# Patient Record
(demographics unavailable — no encounter records)

---

## 2025-01-09 NOTE — DISCUSSION/SUMMARY
[de-identified] : Recommend patient initiate conservative management, including outpatient physical therapy and anti-inflammatory medication if tolerated. She will contact primary care physician and update PCP as to current conditions and work up.  MRI imaging cervical and lumbar spine for further evaluation of intrinsic lesion, disc herniation, or other pathology that could count for both upper and lower right side of symptoms. Clearly, she has a history of lumbar disc herniation on prior lumbar MRI. It is possible that current symptoms in the right lower extremity do stem from this herniation, but I cannot count for her right upper extremity symptoms.   Patient will follow up after MRI imaging to review results. We discussed potential referral to neurology for further work up if imaging is negative.   Prior to appointment and during encounter with patient extensive medical records were reviewed including but not limited to, hospital records, outpatient records, imaging results, and lab data. During this appointment the patient was examined, diagnoses were discussed and explained in a face to face manner. In addition extensive time was spent reviewing aforementioned diagnostic studies. Counseling including abnormal image results, differential diagnoses, treatment options, risk and benefits, lifestyle changes, current condition, and current medications was performed. Patient's comments, questions, and concerns were addressed and patient verbalized understanding. Based on this patient's presentation at our office, which is an orthopedic spine surgeon's office, this patient inherently / intrinsically has a risk, however minute, of developing issues such as Cauda equina syndrome, bowel and bladder changes, or progression of motor or neurological deficits such as paralysis which may be permanent.   I, [FAM Georges], certify that the clinical information was reviewed with Dr. Cheung, who was physically present in the office. He agreed with the above plan of care and was available for consultation as necessary during the office visit.

## 2025-01-09 NOTE — PHYSICAL EXAM
[Normal Coordination] : normal coordination [Normal DTR UE/LE] : normal DTR UE/LE  [Normal Sensation] : normal sensation [Normal Mood and Affect] : normal mood and affect [Oriented] : oriented [Able to Communicate] : able to communicate [Normal Skin] : normal skin [No Rash] : no rash [No Ulcers] : no ulcers [No Lesions] : no lesions [No obvious lymphadenopathy in areas examined] : no obvious lymphadenopathy in areas examined [Well Developed] : well developed [Peripheral vascular exam is grossly normal] : peripheral vascular exam is grossly normal [No Respiratory Distress] : no respiratory distress [Lungs clear to auscultation bilaterally] : lungs clear to auscultation bilaterally [Normal Bowel Sounds] : normal bowel sounds [Non-Tender] : non-tender [No HSM] : no HSM [No Mass] : no mass [NL (80)] : right lateral rotation 80 degrees [Biceps 2+] : biceps 2+ [Triceps 2+] : triceps 2+ [Brachioradialis 2+] : brachioradialis 2+ [NL (90)] : forward flexion 90 degrees [NL (30)] : right lateral rotation 30 degrees [NL (45)] : extension 45 degrees [NL (40)] : right lateral bending 40 degrees [5___] : right extensor hallicus longus 5[unfilled]/5 [] : non-antalgic

## 2025-01-09 NOTE — HISTORY OF PRESENT ILLNESS
[de-identified] : 01/08/2025 - Patient presents to the office for evaluation of right upper extremity tingling, right lower extremity tingling and history of color change to the skin of the right foot. Patient reports a 20+ year history of intermittent low back symptoms dating back to 2005. She has a known history of lumbar disc herniation that has been treated conservatively. Patient reports on 12/19/24. She experienced numbness tingling in the right lower extremity with low back pain. There was also right upper extremity numbness and tingling. She does not recall any trauma. On 12/24/24 she noticed color changes to the lateral aspect of her right foot without previous trauma. She presented to urgent care. X-ray was negative for fracture. Color changes to the foot resolved within 24 hours.  Since the onset of symptoms, she continues to experience, altered sensation and subjective weakness in both upper and lower extremity. Negative constitutional complaints in terms of fever, chills, rash, nausea, vomiting. No vision changes. No changes in hearing. No left upper extremity symptoms. There is a history of Spinal disc degeneration. No familial history of auto immune disorder. PCP had performed work for autoimmune disorders and started patient on SSRI. This did seem to improve her G.I. symptoms.  The patient is a 42 year female who presents today complaining of low back pain radiating down right side Date of Injury/Onset: 12/18/24 Pain:    At Rest: 2/10  With Activity:  6-7/10  Mechanism of injury: NKI Quality of symptoms: numbness, tingling, sharp at times Improves with: nothing Worse with: activity, sneezing Prior treatment: no Prior Imaging: no Additional Information: history of herniated disc in lumbar spine

## 2025-01-09 NOTE — DISCUSSION/SUMMARY
[de-identified] : Recommend patient initiate conservative management, including outpatient physical therapy and anti-inflammatory medication if tolerated. She will contact primary care physician and update PCP as to current conditions and work up.  MRI imaging cervical and lumbar spine for further evaluation of intrinsic lesion, disc herniation, or other pathology that could count for both upper and lower right side of symptoms. Clearly, she has a history of lumbar disc herniation on prior lumbar MRI. It is possible that current symptoms in the right lower extremity do stem from this herniation, but I cannot count for her right upper extremity symptoms.   Patient will follow up after MRI imaging to review results. We discussed potential referral to neurology for further work up if imaging is negative.   Prior to appointment and during encounter with patient extensive medical records were reviewed including but not limited to, hospital records, outpatient records, imaging results, and lab data. During this appointment the patient was examined, diagnoses were discussed and explained in a face to face manner. In addition extensive time was spent reviewing aforementioned diagnostic studies. Counseling including abnormal image results, differential diagnoses, treatment options, risk and benefits, lifestyle changes, current condition, and current medications was performed. Patient's comments, questions, and concerns were addressed and patient verbalized understanding. Based on this patient's presentation at our office, which is an orthopedic spine surgeon's office, this patient inherently / intrinsically has a risk, however minute, of developing issues such as Cauda equina syndrome, bowel and bladder changes, or progression of motor or neurological deficits such as paralysis which may be permanent.   I, [FAM Georges], certify that the clinical information was reviewed with Dr. Cheung, who was physically present in the office. He agreed with the above plan of care and was available for consultation as necessary during the office visit.

## 2025-01-09 NOTE — DATA REVIEWED
[FreeTextEntry1] : MRI 12/4/05 [facility] Lumbar spine Transitional vertebra. DDD L5/S1. Central disc herniation L5 S1 with bilateral facet hypertrophy.  X-ray in the office 01/08/2025 Cervical spine degen change C5/6 with anterior calcification of disc space.   Lumbar spine XR ap/lat 01/08/2025  Maintenance of lumbar lordosis. No significant single level disc space collapse. Mild scoliosis  I stop paperwork reviewed

## 2025-01-09 NOTE — HISTORY OF PRESENT ILLNESS
[de-identified] : 01/08/2025 - Patient presents to the office for evaluation of right upper extremity tingling, right lower extremity tingling and history of color change to the skin of the right foot. Patient reports a 20+ year history of intermittent low back symptoms dating back to 2005. She has a known history of lumbar disc herniation that has been treated conservatively. Patient reports on 12/19/24. She experienced numbness tingling in the right lower extremity with low back pain. There was also right upper extremity numbness and tingling. She does not recall any trauma. On 12/24/24 she noticed color changes to the lateral aspect of her right foot without previous trauma. She presented to urgent care. X-ray was negative for fracture. Color changes to the foot resolved within 24 hours.  Since the onset of symptoms, she continues to experience, altered sensation and subjective weakness in both upper and lower extremity. Negative constitutional complaints in terms of fever, chills, rash, nausea, vomiting. No vision changes. No changes in hearing. No left upper extremity symptoms. There is a history of Spinal disc degeneration. No familial history of auto immune disorder. PCP had performed work for autoimmune disorders and started patient on SSRI. This did seem to improve her G.I. symptoms.  The patient is a 42 year female who presents today complaining of low back pain radiating down right side Date of Injury/Onset: 12/18/24 Pain:    At Rest: 2/10  With Activity:  6-7/10  Mechanism of injury: NKI Quality of symptoms: numbness, tingling, sharp at times Improves with: nothing Worse with: activity, sneezing Prior treatment: no Prior Imaging: no Additional Information: history of herniated disc in lumbar spine

## 2025-01-23 NOTE — DISCUSSION/SUMMARY
[de-identified] : Reviewed and discussed Lumbar and cervical spine MRI with patient today.  The patient will continue lumbar physical therapy to work on stretching, strengthening and range of motion. Patient was provided with a lumbar home exercise program. Discussed with patient if her lumbar pain gets worse despite conservative treatment, we can further discuss a corticosteroid injection. Continue taking NSAIDS as needed for pain.  The patient will follow up as needed.  Cumulative encounter duration exceeded 30 minutes  Follow up: as needed  Prior to appointment and during encounter with patient extensive medical records were reviewed including but not limited to, hospital records, outpatient records, imaging results, and lab data. During this appointment the patient was examined, diagnoses were discussed and explained in a face to face manner. In addition extensive time was spent reviewing aforementioned diagnostic studies. Counseling including abnormal image results, differential diagnoses, treatment options, risk and benefits, lifestyle changes, current condition, and current medications was performed. Patient's comments, questions, and concerns were addressed and patient verbalized understanding. Based on this patient's presentation at our office, which is an orthopedic spine surgeon's office, this patient inherently / intrinsically has a risk, however minute, of developing issues such as Cauda equina syndrome, bowel and bladder changes, or progression of motor or neurological deficits such as paralysis which may be permanent.   I, Indy Garrett, attest that this documentation has been prepared under the direction and in the presence of Provider Jeff Cheung MD.

## 2025-01-23 NOTE — HISTORY OF PRESENT ILLNESS
[de-identified] : 01/22/2025: Patient presents for MRI review of cervical and lumbar spine. Patient reports still some tingling in the arms from time to time has gotten better, her back pain is still a dull pain. The patient has started physical therapy, only been to two sessions so far.    01/08/2025 - Patient presents to the office for evaluation of right upper extremity tingling, right lower extremity tingling and history of color change to the skin of the right foot. Patient reports a 20+ year history of intermittent low back symptoms dating back to 2005. She has a known history of lumbar disc herniation that has been treated conservatively. Patient reports on 12/19/24. She experienced numbness tingling in the right lower extremity with low back pain. There was also right upper extremity numbness and tingling. She does not recall any trauma. On 12/24/24 she noticed color changes to the lateral aspect of her right foot without previous trauma. She presented to urgent care. X-ray was negative for fracture. Color changes to the foot resolved within 24 hours.  Since the onset of symptoms, she continues to experience, altered sensation and subjective weakness in both upper and lower extremity. Negative constitutional complaints in terms of fever, chills, rash, nausea, vomiting. No vision changes. No changes in hearing. No left upper extremity symptoms. There is a history of Spinal disc degeneration. No familial history of auto immune disorder. PCP had performed work for autoimmune disorders and started patient on SSRI. This did seem to improve her G.I. symptoms.  The patient is a 42 year female who presents today complaining of low back pain radiating down right side Date of Injury/Onset: 12/18/24 Pain:    At Rest: 2/10  With Activity:  6-7/10  Mechanism of injury: NKI Quality of symptoms: numbness, tingling, sharp at times Improves with: nothing Worse with: activity, sneezing Prior treatment: no Prior Imaging: no Additional Information: history of herniated disc in lumbar spine

## 2025-01-23 NOTE — HISTORY OF PRESENT ILLNESS
[de-identified] : 01/22/2025: Patient presents for MRI review of cervical and lumbar spine. Patient reports still some tingling in the arms from time to time has gotten better, her back pain is still a dull pain. The patient has started physical therapy, only been to two sessions so far.    01/08/2025 - Patient presents to the office for evaluation of right upper extremity tingling, right lower extremity tingling and history of color change to the skin of the right foot. Patient reports a 20+ year history of intermittent low back symptoms dating back to 2005. She has a known history of lumbar disc herniation that has been treated conservatively. Patient reports on 12/19/24. She experienced numbness tingling in the right lower extremity with low back pain. There was also right upper extremity numbness and tingling. She does not recall any trauma. On 12/24/24 she noticed color changes to the lateral aspect of her right foot without previous trauma. She presented to urgent care. X-ray was negative for fracture. Color changes to the foot resolved within 24 hours.  Since the onset of symptoms, she continues to experience, altered sensation and subjective weakness in both upper and lower extremity. Negative constitutional complaints in terms of fever, chills, rash, nausea, vomiting. No vision changes. No changes in hearing. No left upper extremity symptoms. There is a history of Spinal disc degeneration. No familial history of auto immune disorder. PCP had performed work for autoimmune disorders and started patient on SSRI. This did seem to improve her G.I. symptoms.  The patient is a 42 year female who presents today complaining of low back pain radiating down right side Date of Injury/Onset: 12/18/24 Pain:    At Rest: 2/10  With Activity:  6-7/10  Mechanism of injury: NKI Quality of symptoms: numbness, tingling, sharp at times Improves with: nothing Worse with: activity, sneezing Prior treatment: no Prior Imaging: no Additional Information: history of herniated disc in lumbar spine

## 2025-01-23 NOTE — DATA REVIEWED
[FreeTextEntry1] : MRI lumbar spine [facility] 1/9/25 central disc protrusion and bilateral facet arthropathy of L5-S1, mild canal stenosis of L5-S1. Bilateral lateral recess stenosis of L5-S1 and abutment of the bilateral descending S1 nerve roots.  transitional disc at S1-S2  MRI cervical spine [facility] 1/9/25  small central disc herniations of C6-C7 and C7-T1 no canal or foraminal stenosis. no neural impingement    MRI 12/4/05 [facility] Lumbar spine Transitional vertebra. DDD L5/S1. Central disc herniation L5 S1 with bilateral facet hypertrophy.  X-ray in the office 01/08/2025 Cervical spine degen change C5/6 with anterior calcification of disc space.   Lumbar spine XR ap/lat 01/08/2025  Maintenance of lumbar lordosis. No significant single level disc space collapse. Mild scoliosis  I stop paperwork reviewed

## 2025-01-23 NOTE — DATA REVIEWED
[FreeTextEntry1] : MRI lumbar spine [facility] 1/9/25 central disc protrusion and bilateral facet arthropathy of L5-S1, mild canal stenosis of L5-S1. Bilateral lateral recess stenosis of L5-S1 and abutment of the bilateral descending S1 nerve roots.  transitional disc at S1-S2  MRI cervical spine [facility] 1/9/25  small central disc herniations of C6-C7 and C7-T1 no canal or foraminal stenosis. no neural impingement    MRI 12/4/05 [place] [facility] Lumbar spine Transitional vertebra. DDD L5/S1. Central disc herniation L5 S1 with bilateral facet hypertrophy.  X-ray in the office 01/08/2025 Cervical spine degen change C5/6 with anterior calcification of disc space.   Lumbar spine XR ap/lat 01/08/2025  Maintenance of lumbar lordosis. No significant single level disc space collapse. Mild scoliosis  I stop paperwork reviewed